# Patient Record
Sex: FEMALE | Race: WHITE | NOT HISPANIC OR LATINO | URBAN - NONMETROPOLITAN AREA
[De-identification: names, ages, dates, MRNs, and addresses within clinical notes are randomized per-mention and may not be internally consistent; named-entity substitution may affect disease eponyms.]

---
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
## 2024-01-02 RX ORDER — MINOCYCLINE HYDROCHLORIDE 100 MG/1
CAPSULE ORAL
Qty: 180 | Refills: 1 | Status: ERX | COMMUNITY
Start: 2024-01-02

## 2024-03-04 RX ORDER — FLUCONAZOLE 150 MG/1
TABLET ORAL
Qty: 4 | Refills: 0 | Status: ERX

## 2024-07-23 ENCOUNTER — APPOINTMENT (OUTPATIENT)
Dept: URBAN - NONMETROPOLITAN AREA CLINIC 40 | Age: 19
Setting detail: DERMATOLOGY
End: 2024-07-25

## 2024-07-23 DIAGNOSIS — B36.0 PITYRIASIS VERSICOLOR: ICD-10-CM

## 2024-07-23 DIAGNOSIS — L70.0 ACNE VULGARIS: ICD-10-CM

## 2024-07-23 PROCEDURE — OTHER PRESCRIPTION: OTHER

## 2024-07-23 PROCEDURE — OTHER PRESCRIPTION MEDICATION MANAGEMENT: OTHER

## 2024-07-23 PROCEDURE — 99214 OFFICE O/P EST MOD 30 MIN: CPT

## 2024-07-23 PROCEDURE — OTHER COUNSELING: OTHER

## 2024-07-23 RX ORDER — CLINDAMYCIN PHOSPHATE 10 MG/ML
LOTION TOPICAL
Qty: 180 | Refills: 3 | Status: ERX

## 2024-07-23 RX ORDER — TAZAROTENE 0.1 MG/G
CREAM CUTANEOUS
Qty: 60 | Refills: 5 | Status: ERX | COMMUNITY
Start: 2024-07-23

## 2024-07-23 RX ORDER — FLUCONAZOLE 200 MG/1
TABLET ORAL
Qty: 6 | Refills: 0 | Status: ERX | COMMUNITY
Start: 2024-07-23

## 2024-07-23 ASSESSMENT — LOCATION DETAILED DESCRIPTION DERM
LOCATION DETAILED: INFERIOR MID FOREHEAD
LOCATION DETAILED: LEFT MID-UPPER BACK
LOCATION DETAILED: RIGHT SUPERIOR MEDIAL UPPER BACK
LOCATION DETAILED: MIDDLE STERNUM
LOCATION DETAILED: RIGHT MID-UPPER BACK
LOCATION DETAILED: LEFT SUPERIOR MEDIAL MIDBACK
LOCATION DETAILED: RIGHT INFERIOR MEDIAL UPPER BACK

## 2024-07-23 ASSESSMENT — LOCATION ZONE DERM
LOCATION ZONE: FACE
LOCATION ZONE: TRUNK

## 2024-07-23 ASSESSMENT — LOCATION SIMPLE DESCRIPTION DERM
LOCATION SIMPLE: LEFT LOWER BACK
LOCATION SIMPLE: CHEST
LOCATION SIMPLE: RIGHT UPPER BACK
LOCATION SIMPLE: INFERIOR FOREHEAD
LOCATION SIMPLE: LEFT UPPER BACK

## 2025-01-07 ENCOUNTER — APPOINTMENT (OUTPATIENT)
Dept: URBAN - NONMETROPOLITAN AREA CLINIC 40 | Age: 20
Setting detail: DERMATOLOGY
End: 2025-01-07

## 2025-01-07 DIAGNOSIS — L70.0 ACNE VULGARIS: ICD-10-CM

## 2025-01-07 DIAGNOSIS — B36.0 PITYRIASIS VERSICOLOR: ICD-10-CM

## 2025-01-07 PROCEDURE — OTHER PRESCRIPTION: OTHER

## 2025-01-07 PROCEDURE — OTHER COUNSELING: OTHER

## 2025-01-07 PROCEDURE — 99214 OFFICE O/P EST MOD 30 MIN: CPT

## 2025-01-07 PROCEDURE — OTHER PRESCRIPTION MEDICATION MANAGEMENT: OTHER

## 2025-01-07 RX ORDER — CLINDAMYCIN PHOSPHATE 10 MG/ML
LOTION TOPICAL
Qty: 180 | Refills: 3 | Status: ERX

## 2025-01-07 RX ORDER — FLUCONAZOLE 200 MG/1
TABLET ORAL
Qty: 6 | Refills: 0 | Status: ERX

## 2025-01-07 RX ORDER — TRETIONIN 1 MG/G
CREAM TOPICAL
Qty: 135 | Refills: 5 | Status: ERX | COMMUNITY
Start: 2025-01-07

## 2025-01-07 RX ORDER — MINOCYCLINE HYDROCHLORIDE 100 MG/1
CAPSULE ORAL BID
Qty: 60 | Refills: 5 | Status: ERX

## 2025-01-07 ASSESSMENT — LOCATION DETAILED DESCRIPTION DERM
LOCATION DETAILED: RIGHT INFERIOR MEDIAL UPPER BACK
LOCATION DETAILED: RIGHT SUPERIOR MEDIAL UPPER BACK
LOCATION DETAILED: LEFT MID-UPPER BACK
LOCATION DETAILED: RIGHT MID-UPPER BACK
LOCATION DETAILED: MIDDLE STERNUM
LOCATION DETAILED: INFERIOR MID FOREHEAD
LOCATION DETAILED: LEFT SUPERIOR MEDIAL MIDBACK

## 2025-01-07 ASSESSMENT — LOCATION ZONE DERM
LOCATION ZONE: FACE
LOCATION ZONE: TRUNK

## 2025-01-07 ASSESSMENT — LOCATION SIMPLE DESCRIPTION DERM
LOCATION SIMPLE: RIGHT UPPER BACK
LOCATION SIMPLE: CHEST
LOCATION SIMPLE: INFERIOR FOREHEAD
LOCATION SIMPLE: LEFT UPPER BACK
LOCATION SIMPLE: LEFT LOWER BACK

## 2025-05-13 ENCOUNTER — APPOINTMENT (OUTPATIENT)
Dept: URBAN - NONMETROPOLITAN AREA CLINIC 40 | Age: 20
Setting detail: DERMATOLOGY
End: 2025-05-15

## 2025-05-13 DIAGNOSIS — L70.0 ACNE VULGARIS: ICD-10-CM

## 2025-05-13 DIAGNOSIS — B36.0 PITYRIASIS VERSICOLOR: ICD-10-CM

## 2025-05-13 PROCEDURE — OTHER PRESCRIPTION: OTHER

## 2025-05-13 PROCEDURE — 99214 OFFICE O/P EST MOD 30 MIN: CPT

## 2025-05-13 PROCEDURE — OTHER PRESCRIPTION MEDICATION MANAGEMENT: OTHER

## 2025-05-13 PROCEDURE — OTHER COUNSELING: OTHER

## 2025-05-13 RX ORDER — FLUCONAZOLE 200 MG/1
TABLET ORAL
Qty: 6 | Refills: 0 | Status: ERX

## 2025-05-13 RX ORDER — MINOCYCLINE HYDROCHLORIDE 100 MG/1
CAPSULE ORAL BID
Qty: 60 | Refills: 5 | Status: ERX

## 2025-05-13 ASSESSMENT — LOCATION ZONE DERM
LOCATION ZONE: FACE
LOCATION ZONE: TRUNK

## 2025-05-13 ASSESSMENT — LOCATION DETAILED DESCRIPTION DERM
LOCATION DETAILED: RIGHT MID-UPPER BACK
LOCATION DETAILED: INFERIOR MID FOREHEAD
LOCATION DETAILED: LEFT SUPERIOR MEDIAL MIDBACK
LOCATION DETAILED: RIGHT INFERIOR MEDIAL UPPER BACK
LOCATION DETAILED: RIGHT SUPERIOR MEDIAL UPPER BACK
LOCATION DETAILED: MIDDLE STERNUM
LOCATION DETAILED: LEFT MID-UPPER BACK

## 2025-05-13 ASSESSMENT — LOCATION SIMPLE DESCRIPTION DERM
LOCATION SIMPLE: RIGHT UPPER BACK
LOCATION SIMPLE: CHEST
LOCATION SIMPLE: INFERIOR FOREHEAD
LOCATION SIMPLE: LEFT LOWER BACK
LOCATION SIMPLE: LEFT UPPER BACK